# Patient Record
Sex: FEMALE | URBAN - METROPOLITAN AREA
[De-identification: names, ages, dates, MRNs, and addresses within clinical notes are randomized per-mention and may not be internally consistent; named-entity substitution may affect disease eponyms.]

---

## 2019-03-07 ENCOUNTER — OFFICE VISIT (OUTPATIENT)
Dept: URBAN - METROPOLITAN AREA CLINIC 11 | Facility: CLINIC | Age: 64
End: 2019-03-07
Payer: COMMERCIAL

## 2019-03-07 DIAGNOSIS — E11.9 TYPE 2 DIABETES MELLITUS W/O COMPLICATION: Primary | ICD-10-CM

## 2019-03-07 DIAGNOSIS — H25.813 COMBINED FORMS OF AGE-RELATED CATARACT, BILATERAL: ICD-10-CM

## 2019-03-07 PROCEDURE — 92004 COMPRE OPH EXAM NEW PT 1/>: CPT | Performed by: OPTOMETRIST

## 2019-03-07 ASSESSMENT — VISUAL ACUITY
OD: 20/25
OS: 20/25

## 2019-03-07 ASSESSMENT — INTRAOCULAR PRESSURE
OD: 16
OS: 16

## 2019-03-07 ASSESSMENT — KERATOMETRY
OD: 43.50
OS: 43.38

## 2019-03-07 NOTE — IMPRESSION/PLAN
Impression: Type 2 diabetes mellitus w/o complication: O09.3. Plan: No signs of retinopathy or neovascularization noted. Discussed ocular and systemic benefits of blood sugar control.  RTC 1yr complete exam

## 2019-03-07 NOTE — IMPRESSION/PLAN
Impression: Combined forms of age-related cataract, bilateral: H25.813. Plan: Discussed diagnosis in detail with patient. No treatment is required at this time. Will continue to observe condition and or symptoms. Call if [place] worsens.

## 2022-09-22 ENCOUNTER — OFFICE VISIT (OUTPATIENT)
Dept: URBAN - METROPOLITAN AREA CLINIC 7 | Facility: CLINIC | Age: 67
End: 2022-09-22
Payer: COMMERCIAL

## 2022-09-22 DIAGNOSIS — H35.373 PUCKERING OF MACULA, BILATERAL: Primary | ICD-10-CM

## 2022-09-22 DIAGNOSIS — E11.3312 TYPE 2 DIABETES MELLITUS W/ MODERATE NONPROLIFERATIVE DIABETIC RETINOPATHY W/ MACULAR EDEMA OF LEFT EYE: ICD-10-CM

## 2022-09-22 DIAGNOSIS — H43.813 VITREOUS DEGENERATION, BILATERAL: ICD-10-CM

## 2022-09-22 DIAGNOSIS — E11.3391 TYPE 2 DIABETES MELLITUS W/ MODERATE NONPROLIFERATIVE DIABETIC RETINOPATHY W/O MACULAR EDEMA OF RIGHT EYE: ICD-10-CM

## 2022-09-22 PROCEDURE — 99214 OFFICE O/P EST MOD 30 MIN: CPT | Performed by: OPHTHALMOLOGY

## 2022-09-22 PROCEDURE — 92134 CPTRZ OPH DX IMG PST SGM RTA: CPT | Performed by: OPHTHALMOLOGY

## 2022-09-22 ASSESSMENT — INTRAOCULAR PRESSURE
OS: 19
OD: 20

## 2022-09-22 NOTE — IMPRESSION/PLAN
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 Plan: Tamara Jewell, marco antonoi for referring this nice woman who complains of blurry vision OS x 1 month after CE/IOL. OCT today confirms ERM OU and moderate CME OS. FA shows: angiographic edema OS>OD. We discussed the findings and the edema is likely due to DME and/or psueudophakic edema. Avastin injected OS without complication after discussing the R/IB/A in detail. She will also start PF/ketorolac QID OS.

## 2022-09-22 NOTE — IMPRESSION/PLAN
Impression: Puckering of macula, bilateral: H35.373. Plan: There is an epiretinal membrane (ERM), but the patient is doing well with their current vision, and thus we will observe the ERM for now. OCT shows ERM OU and moderate edema OS. We did discuss the natural history as well as the risks and benefits of observation vs. vitrectomy surgery. The patient will call if they experience decreased vision or increased metamorphopsia. Otherwise, she will f/u with your excellent care. I am happy to see her again anytime. thanks Blaast RTC PRN Prior notes from other provider(s) have been reviewed in conjunction with today's visit.

## 2022-09-22 NOTE — IMPRESSION/PLAN
Impression: Type 2 diabetes mellitus w/ moderate nonproliferative diabetic retinopathy w/o macular edema of right eye: I37.1841. Plan: I emphasized the importance of blood sugar and blood pressure control. The patient understands that controlling BS and BP is the best way to stabilize vision at this time. We also discussed the importance of routine dilated eye exams.